# Patient Record
Sex: MALE | Race: WHITE | Employment: FULL TIME | URBAN - METROPOLITAN AREA
[De-identification: names, ages, dates, MRNs, and addresses within clinical notes are randomized per-mention and may not be internally consistent; named-entity substitution may affect disease eponyms.]

---
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
## 2023-01-04 ENCOUNTER — OFFICE VISIT (OUTPATIENT)
Dept: FAMILY MEDICINE CLINIC | Age: 18
End: 2023-01-04
Payer: COMMERCIAL

## 2023-01-04 VITALS
DIASTOLIC BLOOD PRESSURE: 70 MMHG | SYSTOLIC BLOOD PRESSURE: 132 MMHG | WEIGHT: 246 LBS | OXYGEN SATURATION: 97 % | HEART RATE: 137 BPM | BODY MASS INDEX: 37.4 KG/M2 | TEMPERATURE: 100.3 F

## 2023-01-04 DIAGNOSIS — K52.9 ACUTE GASTROENTERITIS: ICD-10-CM

## 2023-01-04 DIAGNOSIS — R11.2 NAUSEA AND VOMITING, UNSPECIFIED VOMITING TYPE: Primary | ICD-10-CM

## 2023-01-04 PROCEDURE — G8484 FLU IMMUNIZE NO ADMIN: HCPCS | Performed by: STUDENT IN AN ORGANIZED HEALTH CARE EDUCATION/TRAINING PROGRAM

## 2023-01-04 PROCEDURE — 1036F TOBACCO NON-USER: CPT | Performed by: STUDENT IN AN ORGANIZED HEALTH CARE EDUCATION/TRAINING PROGRAM

## 2023-01-04 PROCEDURE — G8417 CALC BMI ABV UP PARAM F/U: HCPCS | Performed by: STUDENT IN AN ORGANIZED HEALTH CARE EDUCATION/TRAINING PROGRAM

## 2023-01-04 PROCEDURE — 99213 OFFICE O/P EST LOW 20 MIN: CPT | Performed by: STUDENT IN AN ORGANIZED HEALTH CARE EDUCATION/TRAINING PROGRAM

## 2023-01-04 PROCEDURE — G8427 DOCREV CUR MEDS BY ELIG CLIN: HCPCS | Performed by: STUDENT IN AN ORGANIZED HEALTH CARE EDUCATION/TRAINING PROGRAM

## 2023-01-04 RX ORDER — ONDANSETRON 4 MG/1
4 TABLET, ORALLY DISINTEGRATING ORAL 3 TIMES DAILY PRN
Qty: 21 TABLET | Refills: 0 | Status: SHIPPED | OUTPATIENT
Start: 2023-01-04

## 2023-01-04 ASSESSMENT — PATIENT HEALTH QUESTIONNAIRE - PHQ9
SUM OF ALL RESPONSES TO PHQ QUESTIONS 1-9: 0
SUM OF ALL RESPONSES TO PHQ QUESTIONS 1-9: 0
SUM OF ALL RESPONSES TO PHQ9 QUESTIONS 1 & 2: 0
2. FEELING DOWN, DEPRESSED OR HOPELESS: 0
SUM OF ALL RESPONSES TO PHQ QUESTIONS 1-9: 0
1. LITTLE INTEREST OR PLEASURE IN DOING THINGS: 0
SUM OF ALL RESPONSES TO PHQ QUESTIONS 1-9: 0

## 2023-01-04 ASSESSMENT — ENCOUNTER SYMPTOMS
VOMITING: 1
SORE THROAT: 0
NAUSEA: 1
WHEEZING: 0
ABDOMINAL PAIN: 0
COUGH: 0
BACK PAIN: 0
SINUS PAIN: 0
DIARRHEA: 1

## 2023-01-04 NOTE — LETTER
[facility] [address] [place] [address]  Phone: [phone]  Fax: [phone], DO        January 4, 2023     Patient: Imelda Amador   YOB: 2005   Date of Visit: 1/4/2023       To Whom It May Concern: It is my medical opinion that Zygmunt Litten may return to work on 01/09/2023. Please excuse any absence he may have had while ill. If you have any questions or concerns, please don't hesitate to call.     Sincerely,          Pedrito Bentley, DO

## 2023-01-04 NOTE — PROGRESS NOTES
HPI Notes    Name: Vikas Goezt  : 2005         Chief Complaint:     Chief Complaint   Patient presents with    Nausea & Vomiting     Symptoms started Monday    Diarrhea       History of Present Illness:        HPI    This is a 21year old young man presenting for n/v/d since two days ago. He is also been febrile, and experiencing chills, sweating. No new foods, eating out. He denies sick contacts as well. He has a mild sore throat, but no other symptoms. Past Medical History:     Past Medical History:   Diagnosis Date    Asthma       Reviewed all health maintenance requirements and ordered appropriate tests  Health Maintenance Due   Topic Date Due    COVID-19 Vaccine (1) Never done    HIV screen  Never done    Hepatitis C screen  Never done    Flu vaccine (1) 2022       Past Surgical History:     Past Surgical History:   Procedure Laterality Date    TONSILLECTOMY          Medications:       Prior to Admission medications    Medication Sig Start Date End Date Taking? Authorizing Provider   ibuprofen (ADVIL;MOTRIN) 400 MG tablet Take 400 mg by mouth every 6 hours as needed for Pain    Historical Provider, MD        Allergies:       Lactose    Social History:     Tobacco:    reports that he has never smoked. He has never used smokeless tobacco.  Alcohol:      reports no history of alcohol use. Drug Use:  reports no history of drug use. Family History:     No family history on file. Review of Systems:         Review of Systems   Constitutional:  Negative for fever. HENT:  Negative for sinus pain, sneezing and sore throat. Respiratory:  Negative for cough and wheezing. Cardiovascular:  Negative for chest pain. Gastrointestinal:  Positive for diarrhea, nausea and vomiting. Negative for abdominal pain. Musculoskeletal:  Negative for back pain. Skin:  Negative for rash. Hematological:  Negative for adenopathy. Psychiatric/Behavioral:  Negative for sleep disturbance. Physical Exam:     Vitals:  /70   Pulse (!) 137   Temp 98.9 °F (37.2 °C)   Wt 246 lb (111.6 kg)   SpO2 97%   BMI 37.40 kg/m²     Physical Exam  Vitals and nursing note reviewed. Constitutional:       General: He is not in acute distress. Appearance: Normal appearance. He is normal weight. Abdominal:      General: Abdomen is flat. Bowel sounds are normal. There is no distension. Palpations: Abdomen is soft. Tenderness: There is no abdominal tenderness. There is no guarding. Musculoskeletal:         General: No swelling or tenderness. Normal range of motion. Skin:     General: Skin is warm and dry. Capillary Refill: Capillary refill takes less than 2 seconds. Neurological:      General: No focal deficit present. Mental Status: He is alert and oriented to person, place, and time. Mental status is at baseline. Psychiatric:         Mood and Affect: Mood normal.         Behavior: Behavior normal.         Thought Content:  Thought content normal.               Data:     Lab Results   Component Value Date/Time     10/01/2018 01:15 PM    K 4.1 10/01/2018 01:15 PM     10/01/2018 01:15 PM    CO2 25 10/01/2018 01:15 PM    BUN 11 10/01/2018 01:15 PM    CREATININE 0.69 10/01/2018 01:15 PM    GLUCOSE 97 10/01/2018 01:15 PM    PROT 7.9 10/01/2018 01:15 PM    LABALBU 5.0 10/01/2018 01:15 PM    BILITOT 0.23 10/01/2018 01:15 PM    ALKPHOS 202 10/01/2018 01:15 PM    AST 19 10/01/2018 01:15 PM    ALT 23 10/01/2018 01:15 PM     Lab Results   Component Value Date/Time    WBC 7.4 10/01/2018 01:15 PM    RBC 5.76 10/01/2018 01:15 PM    HGB 16.0 10/01/2018 01:15 PM    HCT 47.8 10/01/2018 01:15 PM    MCV 82.9 10/01/2018 01:15 PM    MCH 27.8 10/01/2018 01:15 PM    MCHC 33.6 10/01/2018 01:15 PM    RDW 13.0 10/01/2018 01:15 PM     10/01/2018 01:15 PM    MPV NOT REPORTED 10/01/2018 01:15 PM     Lab Results   Component Value Date/Time    TSH 1.77 07/03/2015 08:54 AM     No results found for: CHOL, LDL, HDL, PSA, LABA1C       Assessment & Plan        Diagnosis Orders   1. Nausea and vomiting, unspecified vomiting type  POCT Influenza A/B    POC COVID-19          Will evaluate COVID, influenza. DDX includes those entities, as well as viral gastroenteritis. Would recommend antiemetic therapy along with fluid rehydration, supportive care. PRN follow up. UPDATE: swabs negative. Likely viral gastroenteritis. The patient I had a long discussion about starting Zofran. We discussed its mechanism of action, intended goals, adverse effects, as well as common side effects. They were able to verbalize understanding, and repeat plan back to me. Follow up PRN    Completed Refills   Requested Prescriptions      No prescriptions requested or ordered in this encounter     No follow-ups on file. No orders of the defined types were placed in this encounter. Orders Placed This Encounter   Procedures    POCT Influenza A/B    POC COVID-19     Order Specific Question:   Is this test for diagnosis or screening? Answer:   Diagnosis of ill patient     Order Specific Question:   Symptomatic for COVID-19 as defined by CDC? Answer:   Yes     Order Specific Question:   Date of Symptom Onset     Answer:   1/2/2023     Order Specific Question:   Hospitalized for COVID-19? Answer:   No     Order Specific Question:   Admitted to ICU for COVID-19? Answer:   No     Order Specific Question:   Employed in healthcare setting? Answer:   No     Order Specific Question:   Resident in a congregate (group) care setting? Answer:   No     Order Specific Question:   Previously tested for COVID-19? Answer:   No     Order Specific Question:   Pregnant: Answer:   No         There are no Patient Instructions on file for this visit.     Electronically signed by Ana Lilia Bundy DO on 1/4/2023 at 3:54 PM           Completed Refills   Requested Prescriptions      No prescriptions requested or ordered in this encounter